# Patient Record
Sex: FEMALE | Race: WHITE | NOT HISPANIC OR LATINO | Employment: OTHER | URBAN - METROPOLITAN AREA
[De-identification: names, ages, dates, MRNs, and addresses within clinical notes are randomized per-mention and may not be internally consistent; named-entity substitution may affect disease eponyms.]

---

## 2017-04-10 NOTE — PATIENT DISCUSSION
Continue: Artificial Tears (dextran 70-hypromellose): drops: 1 drop three times a week as needed into both eyes

## 2017-04-10 NOTE — PATIENT DISCUSSION
SUBCONJUNCTIVAL HEMORRHAGE, OD: NO TREATMENT INDICATED. PT ON PLAVIX. WAS SEEN IN THE ER OVER THE WEEKEND AND A CT SCAN BRAIN DONE. PT WAS TOLD HE WOULD LOSE VISION IF HE DIDN'T STOP PLAVIX, BUT NEEDS TO BE ON PLAVIX SINCE CARDIAC STENT.   NO OCULAR INDICATION FOR CESSATION OF ANTICOAGULANTS ON EXAM TODAY

## 2017-04-10 NOTE — PATIENT DISCUSSION
POSTERIOR CAPSULAR FIBROSIS, OS- VISUALLY SIGNIFICANT. SCHEDULE YAG CAPSULOTOMY OS IF VISUAL SYMPTOMS PERSIST.

## 2017-04-10 NOTE — PATIENT DISCUSSION
MODERATE DRY EYES: PRESCRIBED DISAPPEARING PRESERVATIVE OR PRESERVATIVE FREE ARTIFICIAL TEARS BID &ndash; QID4-6X A DAY, OU AND THE DAILY INTAKE OF OMEGA-3 DHA/EPA FATTY ACIDS TO HELP RELIEVE SYMPTOMS. ADD NIGHTLY LUBRICATING OINTMENT OR GEL. RETURN FOR FOLLOW-UP AS SCHEDULED OR SOONER IF SYMPTOMS WORSEN.

## 2017-04-10 NOTE — PATIENT DISCUSSION
CATARACT, OD- VISUALLY SIGNIFICANT. SCHEDULE PHACO WITH IOL OD  IF VISUAL SYMPTOMS PERSIST. GLASSES RX GIVEN TO FILL IF DESIRES IN THE EVENT PATIENT DOES NOT PROCEED WITH SURGERY.

## 2017-05-12 NOTE — PATIENT DISCUSSION
NO FAMILY HISTORY, SUBTLE INFERIOR CHANGES WITH MILD THINNING ON SUPERIOR OCT RNFL, DISCUSSED GTTS/SLT VS OBSERVATION WITH PT WHO WISHES TO WAIT ON TREATMENT.  GOAL IOP &lt;21

## 2018-05-14 NOTE — PATIENT DISCUSSION
*CATARACT, OD - BECOMING VISUALLY SIGNIFICANT BUT NOT BOTHERSOME TO PATIENT AT THIS TIME. SPEC RX OFFERED. FOLLOW AS SCHEDULED.

## 2019-05-15 NOTE — PATIENT DISCUSSION
*CATARACT, OD - SLOWLY BECOMING VISUALLY SIGNIFICANT BUT NOT BOTHERSOME TO PATIENT AT THIS TIME. SPEC RX OFFERED. FOLLOW AS SCHEDULED.

## 2019-05-15 NOTE — PATIENT DISCUSSION
REGGIE OU:  PRESCRIBE ARTIFICIAL TEARS BID - QID. DECREASE OUTDOOR EXPOSURE AND USE UV PROTECTION/ SUNGLASSES WITH OUTDOOR ACTIVITIES. RETURN FOR FOLLOW UP AS SCHEDULED.

## 2019-05-15 NOTE — PATIENT DISCUSSION
Pinguecula Counseling:  I have explained to the patient at length the diagnosis of pinguecula and its pathophysiology. I recommended the patient adequately protect their eyes from excessive UV light and dry, akbar conditions. The use of artificial tears in dry conditions was encouraged. Return for follow-up as scheduled.

## 2020-12-03 NOTE — PATIENT DISCUSSION
New Prescription: Ocuflox (ofloxacin): drops: 0.3% 1 drop three times a day as directed into both eyes 12-

## 2020-12-03 NOTE — PATIENT DISCUSSION
CONJUNCTIVITIS OU:  PRESCRIBE OCUFLOX TID OU  X 7 DAYS. CONTAGIOUS PRECAUTIONS REVIEWED WITH PATIENT. [address] HANDS OFTEN, DISPOSE OF TISSUES, AVOID RUBBING EYES, AND AVOID CLOSE CONTACT FOR 5-7 DAYS. RETURN FOR FOLLOW-UP AS SCHEDULED.

## 2021-06-09 NOTE — PATIENT DISCUSSION
Patient called and advise on Dr Wolfe recommendation and agreed.   Posterior Capsular Fibrosis Counseling: The diagnosis of posterior capsular fibrosis (PCF), also referred to as a secondary cataract or posterior capsular opacification (PCO), was discussed with the patient. The patient understands that their symptoms and limitations are likely related to this condition. I have reviewed the risks, benefits and alternatives of YAG laser surgery for the treatment of the fibrosis. The uncommon risk of an increase in intraocular pressure or a retinal detachment and their associated symptoms were explained to the patient. The patient understands and desires to proceed with the laser surgery to improve their vision.

## 2021-06-09 NOTE — PATIENT DISCUSSION
POSTERIOR CAPSULAR FIBROSIS, OS:  VISUALLY SIGNIFICANT. SCHEDULE YAG CAPSULOTOMY OS AFTER CATARACT SX OD.

## 2021-06-09 NOTE — PATIENT DISCUSSION
CATARACT, OD - VISUALLY SIGNIFICANT. SCHEDULE PHACO WITH IOL OD  AND IOL CALCULATION. GLASSES RX GIVEN TO FILL IF DESIRES IN THE EVENT PATIENT DOES NOT PROCEED WITH SURGERY.

## 2021-06-17 NOTE — PATIENT DISCUSSION
Surgery Drop Counseling:  I have prescribed Omni PREDnisolone sodium phosphate-MOXIfloxacin-BROMfenac combination drop for use as directed before and after cataract surgery.

## 2021-06-17 NOTE — PATIENT DISCUSSION
PVD OU WITH ACUTE PHOTOPSIA OS:  NO HOLES/TEARS/BREAKS ON THOROUGH DFE. EDU PT ON FINDINGS AND SI/SX OF RD INCLUDING ^FLOATERS/FLASHES/VEIL OVER VISION AND ADVISED TO RTC IMMEDIATELY IF ANY OCCUR. MONITOR.

## 2021-06-17 NOTE — PATIENT DISCUSSION
Continue: prednisolone-moxiflox-bromfen (prednisolone-moxiflox-bromfen): drops,suspension: 1-0.5-0.075% 1 drop three times a day as directed into right eye 06-

## 2021-06-17 NOTE — PATIENT DISCUSSION
New Prescription: prednisolone-moxiflox-bromfen (prednisolone-moxiflox-bromfen): drops,suspension: 1-0.5-0.075% 1 drop three times a day as directed into right eye 06-

## 2021-07-07 NOTE — PATIENT DISCUSSION
S/P PHACO W/IOL,OD: FLUID RELEASED FROM AB EXTERNA INCISION AT SLIT LAMP WITHOUT DIFFICULTY. REPEAT IOP CHECK BY DR. LAZO (22 mmhg). INSTILLED ONE DROP OF ANTIBIOTIC IMMEDIATELY. ADD LUMIGAN TO CONTROL IOP UNTIL NEXT POST OP VISIT. CONTINUE TO TAPER DROPS AS DIRECTED. RETURN FOR FOLLOW-UP AS SCHEDULED.

## 2021-07-07 NOTE — PATIENT DISCUSSION
New Prescription: Lumigan (bimatoprost): drops: 0.01% 1 drop every evening as directed into right eye 07-

## 2021-07-13 NOTE — PATIENT DISCUSSION
Continue: prednisolone-moxiflox-bromfen (prednisolone-moxiflox-bromfen): drops,suspension: 1-0.5-0.075% 1 drop three times a day as directed into right eye 06- Implemented All Universal Safety Interventions:  [place] to call system. Call bell, personal items and telephone within reach. Instruct patient to call for assistance. Room bathroom lighting operational. Non-slip footwear when patient is off stretcher. Physically safe environment: no spills, clutter or unnecessary equipment. Stretcher in lowest position, wheels locked, appropriate side rails in place.

## 2022-07-02 RX ORDER — DEXTROAMPHETAMINE SACCHARATE, AMPHETAMINE ASPARTATE, DEXTROAMPHETAMINE SULFATE AND AMPHETAMINE SULFATE 3.75; 3.75; 3.75; 3.75 MG/1; MG/1; MG/1; MG/1
TABLET ORAL
COMMUNITY

## 2022-07-02 RX ORDER — LAMOTRIGINE 200 MG/1
TABLET ORAL
COMMUNITY

## 2022-07-02 RX ORDER — BUPROPION HYDROCHLORIDE 300 MG/1
TABLET ORAL
COMMUNITY

## 2022-07-02 RX ORDER — PHENAZOPYRIDINE HYDROCHLORIDE 200 MG/1
TABLET, FILM COATED ORAL
COMMUNITY

## 2022-07-02 RX ORDER — SULFAMETHOXAZOLE AND TRIMETHOPRIM 800; 160 MG/1; MG/1
TABLET ORAL
COMMUNITY

## 2022-08-16 NOTE — PATIENT DISCUSSION
The patient's symptoms and findings are consistent with viral conjunctivitis with a possibly overlying bacterial conjunctivitis. Vigorous hygiene/contact precautions, cross contamination risks, frequent artificial tears, and cool compresses were discussed with the patient.

## 2022-11-28 ENCOUNTER — ESTABLISHED PATIENT (OUTPATIENT)
Dept: URBAN - METROPOLITAN AREA CLINIC 10 | Facility: CLINIC | Age: 52
End: 2022-11-28

## 2022-11-28 DIAGNOSIS — H52.13: ICD-10-CM

## 2022-11-28 DIAGNOSIS — H35.363: ICD-10-CM

## 2022-11-28 PROCEDURE — 92015 DETERMINE REFRACTIVE STATE: CPT

## 2022-11-28 PROCEDURE — 92014 COMPRE OPH EXAM EST PT 1/>: CPT

## 2022-11-28 PROCEDURE — 92250 FUNDUS PHOTOGRAPHY W/I&R: CPT

## 2022-11-28 ASSESSMENT — KERATOMETRY
OD_K2POWER_DIOPTERS: 44.50
OD_AXISANGLE_DEGREES: 160
OS_AXISANGLE2_DEGREES: 95
OD_AXISANGLE2_DEGREES: 70
OD_K1POWER_DIOPTERS: 43.75
OS_K2POWER_DIOPTERS: 44.25
OS_AXISANGLE_DEGREES: 005
OS_K1POWER_DIOPTERS: 44.00

## 2022-11-28 ASSESSMENT — VISUAL ACUITY
OS_SC: 20/30
OD_SC: 20/25
OU_SC: 20/25

## 2022-11-28 ASSESSMENT — TONOMETRY
OS_IOP_MMHG: 17
OD_IOP_MMHG: 16

## 2023-12-11 ENCOUNTER — ESTABLISHED PATIENT (OUTPATIENT)
Dept: URBAN - METROPOLITAN AREA CLINIC 10 | Facility: CLINIC | Age: 53
End: 2023-12-11

## 2023-12-11 DIAGNOSIS — H35.363: ICD-10-CM

## 2023-12-11 DIAGNOSIS — H52.13: ICD-10-CM

## 2023-12-11 PROCEDURE — 92015 DETERMINE REFRACTIVE STATE: CPT

## 2023-12-11 PROCEDURE — 92014 COMPRE OPH EXAM EST PT 1/>: CPT

## 2023-12-11 PROCEDURE — 92250 FUNDUS PHOTOGRAPHY W/I&R: CPT

## 2023-12-11 ASSESSMENT — KERATOMETRY
OD_AXISANGLE_DEGREES: 155
OD_K2POWER_DIOPTERS: 44.00
OD_K1POWER_DIOPTERS: 43.50
OS_AXISANGLE2_DEGREES: 95
OS_K1POWER_DIOPTERS: 44.00
OS_K2POWER_DIOPTERS: 44.50
OD_AXISANGLE2_DEGREES: 65
OS_AXISANGLE_DEGREES: 5

## 2023-12-11 ASSESSMENT — VISUAL ACUITY
OS_SC: 20/20
OD_SC: J16
OD_SC: 20/25-1
OU_SC: J16
OS_SC: J7-1

## 2023-12-11 ASSESSMENT — TONOMETRY
OS_IOP_MMHG: 13
OD_IOP_MMHG: 13

## 2025-05-05 ENCOUNTER — COMPREHENSIVE EXAM (OUTPATIENT)
Age: 55
End: 2025-05-05

## 2025-05-05 DIAGNOSIS — H52.13: ICD-10-CM

## 2025-05-05 DIAGNOSIS — H35.363: ICD-10-CM

## 2025-05-05 PROCEDURE — 92014 COMPRE OPH EXAM EST PT 1/>: CPT

## 2025-05-05 PROCEDURE — 92015 DETERMINE REFRACTIVE STATE: CPT

## 2025-05-05 PROCEDURE — 92250 FUNDUS PHOTOGRAPHY W/I&R: CPT
